# Patient Record
Sex: FEMALE | Race: BLACK OR AFRICAN AMERICAN | Employment: UNEMPLOYED | URBAN - METROPOLITAN AREA
[De-identification: names, ages, dates, MRNs, and addresses within clinical notes are randomized per-mention and may not be internally consistent; named-entity substitution may affect disease eponyms.]

---

## 2017-01-21 DIAGNOSIS — D50.9 IRON DEFICIENCY ANEMIA, UNSPECIFIED: Primary | ICD-10-CM

## 2017-01-23 NOTE — TELEPHONE ENCOUNTER
ferrous gluconate (FERGON) 324 (38 FE) MG      Last Written Prescription Date: 1/13/16  Last Fill Quantity:  90 refills: 3  Last Office Visit : 9/14/16  Future Office visit:  None  HEMOGLOBIN   Date Value Ref Range Status   01/20/2016 10.2* 11.7 - 15.7 g/dL Final    Routing refill request to provider for review/approval because:  Drug not active on patient's medication list Stopped by Patient 4/20/16 + hgb OVER DUE

## 2017-01-24 RX ORDER — FERROUS GLUCONATE 324(38)MG
TABLET ORAL
Qty: 90 TABLET | Refills: 3 | Status: SHIPPED | OUTPATIENT
Start: 2017-01-24

## 2017-05-04 ENCOUNTER — TELEPHONE (OUTPATIENT)
Dept: PHARMACY | Facility: OTHER | Age: 57
End: 2017-05-04

## 2017-05-04 NOTE — TELEPHONE ENCOUNTER
PA required on: Pantoprazole Sod 40mg  Qty: 30  Day Supply: 30  Patient Insurance is: [employer]   Insurance phone number is: [phone]  ID #: [id number]      Please let us know when PA is granted or denied.     Thank you,  Krista Owens Radha  [facility]

## 2017-05-05 NOTE — TELEPHONE ENCOUNTER
[facility] Prior Authorization Team   Phone: [phone]  Fax: [phone]    PA Initiation    Medication: PANTOPRAZOLE 40MG  Insurance Company: [employer] - Phone [phone] Fax [phone]  Pharmacy Filling the Rx: [facility] [place], [place] - [address] [place] [address] [phone]  Filling Pharmacy Phone: [phone]  Filling Pharmacy Fax:    Start Date: 5/5/2017

## 2017-05-09 DIAGNOSIS — K31.89 EROSIVE GASTROPATHY: ICD-10-CM

## 2017-05-09 NOTE — TELEPHONE ENCOUNTER
Prior Authorization Approval    Authorization Effective Date: 5/5/2017  Authorization Expiration Date: 5/5/2018  Medication: PANTOPRAZOLE 40MG - approved  Approved Dose/Quantity:   Reference #: [id number]   Insurance Company: [employer] - Phone [phone] Fax [phone]  Expected CoPay: n/a     CoPay Card Available:      Foundation Assistance Needed:    Which Pharmacy is filling the prescription (Not needed for infusion/clinic administered): [facility] [address] [phone]  Pharmacy Notified: Yes  Patient Notified: Yes      Per pharmacy they need an updated script for this medication

## 2017-05-11 RX ORDER — PANTOPRAZOLE SODIUM 40 MG/1
40 TABLET, DELAYED RELEASE ORAL DAILY
Qty: 90 TABLET | Refills: 1 | Status: SHIPPED | OUTPATIENT
Start: 2017-05-11 | End: 2018-05-03

## 2017-05-11 NOTE — TELEPHONE ENCOUNTER
Protonix      Last Written Prescription Date:  4-20-16  Last Fill Quantity: 30,   # refills: 11  Last Office Visit : 9-14-16  Future Office visit:  none    Kathleen M Doege RN

## 2017-05-18 DIAGNOSIS — I10 ESSENTIAL HYPERTENSION: ICD-10-CM

## 2017-05-18 DIAGNOSIS — I16.1 HYPERTENSIVE EMERGENCY: ICD-10-CM

## 2017-05-18 DIAGNOSIS — I26.99 OTHER PULMONARY EMBOLISM WITHOUT ACUTE COR PULMONALE, UNSPECIFIED CHRONICITY (H): ICD-10-CM

## 2017-05-18 DIAGNOSIS — I24.9 ACS (ACUTE CORONARY SYNDROME) (H): ICD-10-CM

## 2017-05-22 RX ORDER — ATORVASTATIN CALCIUM 20 MG/1
20 TABLET, FILM COATED ORAL DAILY
Qty: 90 TABLET | Refills: 0 | Status: SHIPPED | OUTPATIENT
Start: 2017-05-22 | End: 2018-05-03

## 2017-05-22 RX ORDER — LOSARTAN POTASSIUM 25 MG/1
25 TABLET ORAL DAILY
Qty: 90 TABLET | Refills: 0 | Status: SHIPPED | OUTPATIENT
Start: 2017-05-22 | End: 2017-09-20

## 2017-05-22 NOTE — TELEPHONE ENCOUNTER
losartan (COZAAR) 25 MG      Last Written Prescription Date:4/20/16  Last Fill Quantity: 30, # refills: 11  Last Office Visit : 9/14/16  Next Clinic Visit: NONE    Potassium   Date Value Ref Range Status   09/14/2016 3.8 3.4 - 5.3 mmol/L Final     Creatinine   Date Value Ref Range Status   09/14/2016 1.08 (H) 0.52 - 1.04 mg/dL Final     BP Readings from Last 3 Encounters:   10/06/16 123/78   09/14/16 150/83   05/19/16 145/86      atorvastatin        Last Written Prescription Date:  4/20/16  Last Fill Quantity: 30,   # refills: 11     rivaroxaban      Last Written Prescription Date:  4/20/16  Last Fill Quantity: 30,   # refills: 11  CBC RESULTS:   Recent Labs   Lab Test  01/20/16   1225   WBC  3.5*   RBC  4.22   HGB  10.2*   HCT  32.2*   MCV  76*   MCH  24.2*   MCHC  31.7   RDW  16.3*   PLT  213     Creatinine   Date Value Ref Range Status   09/14/2016 1.08 (H) 0.52 - 1.04 mg/dL Final   Routing refill request to provider for review/approval because:  CBC  OVERDUE

## 2017-05-23 NOTE — TELEPHONE ENCOUNTER
Can you ask her to come in for CBC and bmp.  Thanks.        Topic Date Due     Colon Cancer Screening - every 10 years.  11/01/2022

## 2017-06-05 ENCOUNTER — TELEPHONE (OUTPATIENT)
Dept: INTERNAL MEDICINE | Facility: CLINIC | Age: 57
End: 2017-06-05

## 2017-06-05 ENCOUNTER — TELEPHONE (OUTPATIENT)
Dept: RHEUMATOLOGY | Facility: CLINIC | Age: 57
End: 2017-06-05

## 2017-06-05 NOTE — TELEPHONE ENCOUNTER
Writer called patient and a language barrier noted. Patient speaks english but, difficult to understand. Writer informed patient that she is overdue for labs and monitoring of toxicity id required while taking medication. Patient repeatedly asked about appointment letter being sent to home and she has not had to see the provider in over an year. No consent to communicate on file and no language listed for interpretation.   Lucille Carlson LPN

## 2017-06-05 NOTE — TELEPHONE ENCOUNTER
Call from patient's daughter, Marie. Patient having edema of feet and ankles that has been worse than usual for the past 2-3 weeks. Called to schedule appointment with Dr. Duran and first available was 7/19/17. Patient has a lab appointment on 6/9/17. Scheduled her to see Virginia Shane NP in [facility] that same day. Will then keep appointment with Dr. Duran in July for follow-up.

## 2017-06-05 NOTE — TELEPHONE ENCOUNTER
----- Message from Lucille Carlson LPN sent at 6/5/2017  2:36 PM CDT -----  Regarding: FW: Dr. Webber - Pt question on labs  Contact: [phone]      ----- Message -----     From: Yulia Cohen     Sent: 6/5/2017   1:23 PM       To: Adult Rheum Triage-  Subject: Dr. Webber - Pt question on labs                 Hello  Pt coming in 9/14 2:30p with Dr. Webber - Pt scheduled labs for 12p.  Pt wondering if that's enough time for the labs to get to the appt.  Pt can be reached at [phone].  Ok to leave .  Thank you  Yulia  [facility]

## 2017-06-26 ENCOUNTER — MYC MEDICAL ADVICE (OUTPATIENT)
Dept: INTERNAL MEDICINE | Facility: CLINIC | Age: 57
End: 2017-06-26

## 2017-06-26 RX ORDER — PREDNISONE 2.5 MG/1
TABLET ORAL
Qty: 60 TABLET | Refills: 6 | OUTPATIENT
Start: 2017-06-26

## 2017-06-27 DIAGNOSIS — M32.19 SYSTEMIC LUPUS ERYTHEMATOSUS WITH OTHER ORGAN INVOLVEMENT: ICD-10-CM

## 2017-06-27 DIAGNOSIS — Z79.60 LONG-TERM USE OF IMMUNOSUPPRESSANT MEDICATION: Primary | ICD-10-CM

## 2017-06-27 RX ORDER — AZATHIOPRINE 50 MG/1
50 TABLET ORAL DAILY
Qty: 30 TABLET | Refills: 0 | Status: SHIPPED | OUTPATIENT
Start: 2017-06-27 | End: 2017-08-18

## 2017-06-27 NOTE — LETTER
[facility] RHEUMATOLOGY  [address]  [address] [facility] [address]  [phone]      June 27, 2017      Nikki Hobbs  [address] [facility] [address]    Dear Nikki,    This letter is a reminder that you are overdue for needed labs. You must have appropriate labs drawn every 8-12 weeks for prescription refills.   Your most recent labs on record are below:    CBC RESULTS:   Recent Labs   Lab Test  01/20/16   1225   WBC  3.5*   RBC  4.22   HGB  10.2*   HCT  32.2*   MCV  76*   MCH  24.2*   MCHC  31.7   RDW  16.3*   PLT  213     Lab Results   Component Value Date    ALT 12 01/20/2016     Lab Results   Component Value Date    ALBUMIN 3.3 11/13/2015     A 30 day supply/refill of your azaTHIOprine (IMURAN) 50 MG tablet has been sent to your doctor for approval while you get your labs completed.      Regards,  Rheumatology Clinic Staff

## 2017-06-27 NOTE — TELEPHONE ENCOUNTER
azaTHIOprine (IMURAN) 50 MG tablet      Last Written Prescription Date:  10/6/2016  Last Fill Quantity: 60,   # refills: 3  Last Office Visit : 10/6/2016  Future Office visit:  9/14/2017    CBC RESULTS:   Recent Labs   Lab Test  01/20/16   1225   WBC  3.5*   RBC  4.22   HGB  10.2*   HCT  32.2*   MCV  76*   MCH  24.2*   MCHC  31.7   RDW  16.3*   PLT  213     Lab Results   Component Value Date    ALT 12 01/20/2016     Lab Results   Component Value Date    ALBUMIN 3.3 11/13/2015       Routing refill request to provider:  > 6 months since last visit and all required labs > 12 weeks since last done - CBC, ALT, Albumin.  Per protocol, will pend Rx for 1 month supply and no refills. Lab letter sent.

## 2017-06-28 NOTE — TELEPHONE ENCOUNTER
Regarding: Medication Question  Contact: [phone]  Suze (daughter) called.   She would like to speak with you in regards to the patient's medication rivaroxaban ANTICOAGULANT (XARELTO) 15 MG TABS tablet.  Wants to discuss about changing the dosage.     June 28, 2017 8:30 AM  Spoke with patient's daughter, Suze. Patient's insurance has lapsed. She will be going to complete the paperwork tomorrow, but it may take a week or two for the insurance to be reinstated. Patient is out of Xarelto, and they would like to substitute aspirin for the anticoagulant until they can get Xarelto through insurance again. Explained that aspirin will not provide the same protection against blood clots. Spoke with [facility], who recommended that I contact [employer] about a co-pay card for Xarelto. I did contact [employer] at #[phone]. The co-pay card would not be appropriate in patient's situation, but they do have a free 30-day trial program that patient would be eligible for and could get as soon as today. Provided Suze with phone number to call for that progam. Patient will need to give verbal permission for [employer] to speak with Suze. Advised Suze to contact the clinic if she is not able to get Xarelto for patient through this program.   Tere Rodríguez RN, Care Coordinator

## 2017-07-10 DIAGNOSIS — M79.2 NEUROPATHIC PAIN: ICD-10-CM

## 2017-07-10 DIAGNOSIS — M32.19 SYSTEMIC LUPUS ERYTHEMATOSUS WITH OTHER ORGAN INVOLVEMENT: Primary | ICD-10-CM

## 2017-07-11 PROBLEM — M32.19 SYSTEMIC LUPUS ERYTHEMATOSUS WITH OTHER ORGAN INVOLVEMENT: Status: ACTIVE | Noted: 2017-07-11

## 2017-07-11 RX ORDER — GABAPENTIN 100 MG/1
100 CAPSULE ORAL 3 TIMES DAILY
Qty: 90 CAPSULE | Refills: 1 | Status: SHIPPED | OUTPATIENT
Start: 2017-07-11 | End: 2017-11-29

## 2017-07-11 RX ORDER — PREDNISONE 2.5 MG/1
2.5 TABLET ORAL DAILY
Qty: 60 TABLET | Refills: 0 | Status: SHIPPED | OUTPATIENT
Start: 2017-07-11 | End: 2017-09-22

## 2017-07-11 NOTE — TELEPHONE ENCOUNTER
gabapentin  100 MG       Last Written Prescription Date:  4/20/16  Last Fill Quantity: 90,   # refills: 11  Last Office Visit : 9/14/16  Future Office visit:  7/19/17  Creatinine   Date Value Ref Range Status   09/14/2016 1.08 (H) 0.52 - 1.04 mg/dL Final     BP Readings from Last 3 Encounters:   10/06/16 123/78   09/14/16 150/83   05/19/16 145/86

## 2017-07-11 NOTE — TELEPHONE ENCOUNTER
predniSONE  2.5 MG      Last Written Prescription Date:  5/19/16  Last Fill Quantity: 60,   # refills: 6  Last Office Visit : 10/6/16  Future Office visit:  9/14/17  Routing refill request to provider for review/approval because:  Drug not active on patient's medication list  DC'D  10/6/16   Therapy completed

## 2017-08-18 DIAGNOSIS — Z79.60 LONG-TERM USE OF IMMUNOSUPPRESSANT MEDICATION: ICD-10-CM

## 2017-08-18 DIAGNOSIS — M32.19 SYSTEMIC LUPUS ERYTHEMATOSUS WITH OTHER ORGAN INVOLVEMENT: ICD-10-CM

## 2017-08-18 RX ORDER — AZATHIOPRINE 50 MG/1
50 TABLET ORAL DAILY
Qty: 30 TABLET | Refills: 0 | Status: SHIPPED | OUTPATIENT
Start: 2017-08-18 | End: 2017-09-22

## 2017-08-18 NOTE — TELEPHONE ENCOUNTER
azathioprine  Last Written Prescription Date:  6/27/17  Last Fill Quantity: 30,   # refills: 0  Last Office Visit: 10/6/16  Future Office visit:  9/14/17    CBC RESULTS:   Recent Labs   Lab Test  01/20/16   1225   WBC  3.5*   RBC  4.22   HGB  10.2*   HCT  32.2*   MCV  76*   MCH  24.2*   MCHC  31.7   RDW  16.3*   PLT  213       Creatinine   Date Value Ref Range Status   09/14/2016 1.08 (H) 0.52 - 1.04 mg/dL Final   ]    Liver Function Studies -   Recent Labs   Lab Test  01/20/16   1225  11/13/15   1131   PROTTOTAL   --   7.0   ALBUMIN   --   3.3*   BILITOTAL   --   0.5   ALKPHOS   --   51   AST  21  21   ALT  12  19       Routing refill request to provider for review/approval because:  Drug not on the G, P or [facility] refill protocol or controlled substance

## 2017-09-20 DIAGNOSIS — I10 ESSENTIAL HYPERTENSION: ICD-10-CM

## 2017-09-20 NOTE — LETTER
September 21, 2017      TO: Nikki Anjel  [address] [facility] [address]     Dear Ms. Nikki Anjel,    This letter is a reminder that you are overdue to see your Primary Care Provider for an Annual Visit and needed labs. You must be seen by your Primary Care Provider on a yearly basis and have appropriate labs drawn for continued care and prescription refills. Please call [phone] to schedule an appointment for an Annual Visit with LIZ COSTA MD.  LAST MD APPT.  9/14/16     You have been given a 30 day supply/refill of your losartan (COZAAR) 25 MG  while you get your clinic visit/labs completed.    Regards,  Primary Care Center

## 2017-09-21 RX ORDER — LOSARTAN POTASSIUM 25 MG/1
25 TABLET ORAL DAILY
Qty: 30 TABLET | Refills: 0 | Status: SHIPPED | OUTPATIENT
Start: 2017-09-21 | End: 2017-10-26

## 2017-09-21 NOTE — TELEPHONE ENCOUNTER
losartan (COZAAR)      Last Written Prescription Date:5/22/17  Last Fill Quantity: 90, # refills: 0  Last Office Visit : 9/14/16  Next Clinic Visit: NONE    Potassium   Date Value Ref Range Status   09/14/2016 3.8 3.4 - 5.3 mmol/L Final     Creatinine   Date Value Ref Range Status   09/14/2016 1.08 (H) 0.52 - 1.04 mg/dL Final     BP Readings from Last 3 Encounters:   10/06/16 123/78   09/14/16 150/83   05/19/16 145/86     OVER MIGUELINA PALMER APPT.  LETTER SENT + 30 DAY RF

## 2017-09-22 DIAGNOSIS — Z79.60 LONG-TERM USE OF IMMUNOSUPPRESSANT MEDICATION: ICD-10-CM

## 2017-09-22 DIAGNOSIS — M32.19 SYSTEMIC LUPUS ERYTHEMATOSUS WITH OTHER ORGAN INVOLVEMENT: ICD-10-CM

## 2017-09-22 RX ORDER — AZATHIOPRINE 50 MG/1
50 TABLET ORAL DAILY
Qty: 30 TABLET | Refills: 0 | Status: SHIPPED | OUTPATIENT
Start: 2017-09-22 | End: 2017-10-28

## 2017-09-22 RX ORDER — PREDNISONE 2.5 MG/1
2.5 TABLET ORAL DAILY
Qty: 30 TABLET | Refills: 0 | Status: SHIPPED | OUTPATIENT
Start: 2017-09-22 | End: 2017-10-28

## 2017-09-22 NOTE — LETTER
9/22/2017     Nikki Hobbs  [address] [facility] [address]      Dear Nikki:    This letter is a reminder that you are overdue to see your Rheumatologist for clinic visit and needed labs. Please call [phone] to schedule an appointment with KAYA Morales [facility] RHEUMATOLOGY  [address] [address]  Phone: [phone]  Fax: [phone]

## 2017-09-22 NOTE — TELEPHONE ENCOUNTER
Imuran  Last Written Prescription Date:  8/18/17  Last Fill Quantity: 30,   # refills: 0  Prednisone  Last Written: 7/11/17  #60 refill 0  Last Office Visit: 10/6/16  Future Office visit:  No future appt    CBC RESULTS:   Recent Labs   Lab Test  01/20/16   1225   WBC  3.5*   RBC  4.22   HGB  10.2*   HCT  32.2*   MCV  76*   MCH  24.2*   MCHC  31.7   RDW  16.3*   PLT  213       Creatinine   Date Value Ref Range Status   09/14/2016 1.08 (H) 0.52 - 1.04 mg/dL Final   ]    Liver Function Studies -   Recent Labs   Lab Test  01/20/16   1225  11/13/15   1131   PROTTOTAL   --   7.0   ALBUMIN   --   3.3*   BILITOTAL   --   0.5   ALKPHOS   --   51   AST  21  21   ALT  12  19       Routing refill request to provider for review/approval because:  Drug not on the [facility], P or [facility] refill protocol or controlled substance  Letter sent asking pt to schedule and notified that labs are due

## 2017-10-26 DIAGNOSIS — I10 ESSENTIAL HYPERTENSION: ICD-10-CM

## 2017-10-26 DIAGNOSIS — M79.2 NEUROPATHIC PAIN: ICD-10-CM

## 2017-10-27 RX ORDER — GABAPENTIN 100 MG/1
CAPSULE ORAL
Qty: 90 CAPSULE | Refills: 1 | OUTPATIENT
Start: 2017-10-27

## 2017-10-28 DIAGNOSIS — Z79.60 LONG-TERM USE OF IMMUNOSUPPRESSANT MEDICATION: ICD-10-CM

## 2017-10-31 DIAGNOSIS — Z53.9 ERRONEOUS ENCOUNTER--DISREGARD: Primary | ICD-10-CM

## 2017-10-31 RX ORDER — LOSARTAN POTASSIUM 25 MG/1
25 TABLET ORAL DAILY
Qty: 30 TABLET | Refills: 1 | Status: SHIPPED | OUTPATIENT
Start: 2017-10-31 | End: 2018-01-10

## 2017-10-31 NOTE — TELEPHONE ENCOUNTER
I spoke with patient and advised her of need to schedule appointment to see Dr. Duran. Will give additional 30-day refill of losartan with 1 refill so that patient is not without her medication and has time to get in for appointment. Confirmed with pharmacy that patient picked up 90-day supply of gabapentin on 9/23/17, so no refill should be needed on that at this time.     Potassium   Date Value Ref Range Status   09/14/2016 3.8 3.4 - 5.3 mmol/L Final     Lab Results   Component Value Date    CR 1.08 09/14/2016     BP Readings from Last 3 Encounters:   10/06/16 123/78   09/14/16 150/83   05/19/16 145/86

## 2017-10-31 NOTE — TELEPHONE ENCOUNTER
azaTHIOprine       Last Written Prescription Date:  9/22/17  Last Fill Quantity: 30,   # refills: 0  Last Office Visit: 10/6/16  Future Office visit:  NONE    CBC RESULTS:   Recent Labs   Lab Test  01/20/16   1225   WBC  3.5*   RBC  4.22   HGB  10.2*   HCT  32.2*   MCV  76*   MCH  24.2*   MCHC  31.7   RDW  16.3*   PLT  213       Creatinine   Date Value Ref Range Status   09/14/2016 1.08 (H) 0.52 - 1.04 mg/dL Final   ]    Liver Function Studies -   Recent Labs   Lab Test  01/20/16   1225  11/13/15   1131   PROTTOTAL   --   7.0   ALBUMIN   --   3.3*   BILITOTAL   --   0.5   ALKPHOS   --   51   AST  21  21   ALT  12  19     Routing refill request to provider for review/approval because: OVER DUE MD APPT/ LABS      predniSONE       Last Written Prescription Date:  9/22/17  Last Fill Quantity: 30,   # refills: 0  OVER DUE MD APPT/ LABS

## 2017-11-01 RX ORDER — AZATHIOPRINE 50 MG/1
50 TABLET ORAL DAILY
Qty: 30 TABLET | Refills: 0 | Status: SHIPPED | OUTPATIENT
Start: 2017-11-01 | End: 2017-12-01

## 2017-11-01 RX ORDER — PREDNISONE 2.5 MG/1
2.5 TABLET ORAL DAILY
Qty: 30 TABLET | Refills: 0 | Status: SHIPPED | OUTPATIENT
Start: 2017-11-01 | End: 2017-12-05

## 2017-11-29 DIAGNOSIS — M79.2 NEUROPATHIC PAIN: ICD-10-CM

## 2017-11-29 DIAGNOSIS — D63.8 ANEMIA IN OTHER CHRONIC DISEASES CLASSIFIED ELSEWHERE: ICD-10-CM

## 2017-11-29 NOTE — LETTER
Nikki Hobbs  [address] [facility] [address]          This letter is a reminder that you are overdue to see your Primary Care Provider for an Annual Visit and needed labs. You must be seen by your Primary Care Provider on a yearly basis and have appropriate labs drawn for continued care and prescription refills. Please call [phone] to schedule an appointment for an Annual Visit with Dr Roger PALMER.         [facility],    Primary Care [place]

## 2017-12-01 DIAGNOSIS — Z79.60 LONG-TERM USE OF IMMUNOSUPPRESSANT MEDICATION: ICD-10-CM

## 2017-12-04 NOTE — TELEPHONE ENCOUNTER
gabapentin (NEURONTIN) 100 MG capsule    Last Written Prescription Date:  7/11/17  Last Fill Quantity: 90,   # refills: 1  Last Office Visit :   9/14/16  Future Office visit:   none    ascorbic acid (VITAMIN C) 500 MG tablet  Last Written Prescription Date:  6/3/16  Last Fill Quantity: 90,   # refills: 3      appt letter sent.    Routing  Because:   gabapentin (NEURONTIN) 100 MG capsule. Not on new protocol. > 1 yr since last visit.

## 2017-12-05 DIAGNOSIS — Z79.60 LONG-TERM USE OF IMMUNOSUPPRESSANT MEDICATION: Primary | ICD-10-CM

## 2017-12-05 DIAGNOSIS — M32.19 SYSTEMIC LUPUS ERYTHEMATOSUS WITH OTHER ORGAN INVOLVEMENT, UNSPECIFIED SLE TYPE (H): ICD-10-CM

## 2017-12-05 NOTE — TELEPHONE ENCOUNTER
azaTHIOprine (IMURAN) 50 MG tablet      Last Written Prescription Date:  11-1-17  Last Fill Quantity: 30,   # refills: 0  Last Office Visit: 10-6-16  Future Office visit:  none    CBC RESULTS:   Recent Labs   Lab Test  01/20/16   1225   WBC  3.5*   RBC  4.22   HGB  10.2*   HCT  32.2*   MCV  76*   MCH  24.2*   MCHC  31.7   RDW  16.3*   PLT  213       Creatinine   Date Value Ref Range Status   09/14/2016 1.08 (H) 0.52 - 1.04 mg/dL Final   ]    Liver Function Studies -   Recent Labs   Lab Test  01/20/16   1225  11/13/15   1131   PROTTOTAL   --   7.0   ALBUMIN   --   3.3*   BILITOTAL   --   0.5   ALKPHOS   --   51   AST  21  21   ALT  12  19     Pt has had 2 letters sent this year about labs and clinic appointments  Pt has cancelled 1 appointment and NOS another appointment since last seen.    Sending to clinic staff prior to sending to provider due to compliance issues    Kathleen M Doege RN

## 2017-12-07 ENCOUNTER — OFFICE VISIT (OUTPATIENT)
Dept: FAMILY MEDICINE | Facility: CLINIC | Age: 57
End: 2017-12-07
Payer: COMMERCIAL

## 2017-12-07 VITALS
SYSTOLIC BLOOD PRESSURE: 114 MMHG | WEIGHT: 107 LBS | BODY MASS INDEX: 18.96 KG/M2 | DIASTOLIC BLOOD PRESSURE: 72 MMHG | HEIGHT: 63 IN | HEART RATE: 96 BPM | TEMPERATURE: 98.4 F | OXYGEN SATURATION: 100 %

## 2017-12-07 DIAGNOSIS — M32.19 SYSTEMIC LUPUS ERYTHEMATOSUS WITH OTHER ORGAN INVOLVEMENT, UNSPECIFIED SLE TYPE (H): Primary | ICD-10-CM

## 2017-12-07 DIAGNOSIS — Z23 NEED FOR PROPHYLACTIC VACCINATION AND INOCULATION AGAINST INFLUENZA: ICD-10-CM

## 2017-12-07 LAB
ANION GAP SERPL CALCULATED.3IONS-SCNC: 10 MMOL/L (ref 3–14)
BUN SERPL-MCNC: 9 MG/DL (ref 7–30)
CALCIUM SERPL-MCNC: 8.8 MG/DL (ref 8.5–10.1)
CHLORIDE SERPL-SCNC: 107 MMOL/L (ref 94–109)
CO2 SERPL-SCNC: 25 MMOL/L (ref 20–32)
CREAT SERPL-MCNC: 1.03 MG/DL (ref 0.52–1.04)
GFR SERPL CREATININE-BSD FRML MDRD: 55 ML/MIN/1.7M2
GLUCOSE SERPL-MCNC: 74 MG/DL (ref 70–99)
POTASSIUM SERPL-SCNC: 3.7 MMOL/L (ref 3.4–5.3)
SODIUM SERPL-SCNC: 142 MMOL/L (ref 133–144)

## 2017-12-07 PROCEDURE — 80048 BASIC METABOLIC PNL TOTAL CA: CPT | Performed by: FAMILY MEDICINE

## 2017-12-07 PROCEDURE — 99213 OFFICE O/P EST LOW 20 MIN: CPT | Mod: 25 | Performed by: PHYSICIAN ASSISTANT

## 2017-12-07 PROCEDURE — 90471 IMMUNIZATION ADMIN: CPT | Performed by: PHYSICIAN ASSISTANT

## 2017-12-07 PROCEDURE — 36415 COLL VENOUS BLD VENIPUNCTURE: CPT | Performed by: FAMILY MEDICINE

## 2017-12-07 PROCEDURE — 90686 IIV4 VACC NO PRSV 0.5 ML IM: CPT | Performed by: PHYSICIAN ASSISTANT

## 2017-12-07 RX ORDER — PREDNISONE 2.5 MG/1
2.5 TABLET ORAL DAILY
Qty: 30 TABLET | Refills: 0 | Status: SHIPPED | OUTPATIENT
Start: 2017-12-07 | End: 2018-01-03

## 2017-12-07 RX ORDER — AZATHIOPRINE 50 MG/1
50 TABLET ORAL DAILY
Qty: 30 TABLET | Refills: 0 | Status: SHIPPED | OUTPATIENT
Start: 2017-12-07 | End: 2018-01-03

## 2017-12-07 NOTE — PROGRESS NOTES
"  SUBJECTIVE:   Nikki Hobbs is a 57 year old female who presents to clinic today for the following health issues:      Pt is here with concerns on the following  Weight loss, dry skin, wrinkles and dark marks on stomach  Onset: Dark marks started last year along with the rest of the symptoms.       Problem list and histories reviewed & adjusted, as indicated.  Additional history: PMHx sig for SLE. She is unsure of the status of her renal function.  She is amenable to the lab work-up that was recommended by her rheumatologist at this time.  She will also schedule her follow up appt today.       ROS:  Constitutional, HEENT, cardiovascular, pulmonary, gi and gu systems are negative, except as otherwise noted.      OBJECTIVE:   /72 (BP Location: Right arm, Patient Position: Sitting, Cuff Size: Adult Small)  Pulse 96  Temp 98.4  F (36.9  C) (Oral)  Ht 5' 3\" (1.6 m)  Wt 107 lb (48.5 kg)  SpO2 100%  BMI 18.95 kg/m2  Body mass index is 18.95 kg/(m^2).  GENERAL: healthy, alert and no distress  SKIN: dry skin with ichthyotic scales on extremities c/w dehydration.  Hypermelanosis several sites.     ASSESSMENT/PLAN:   1. Systemic lupus erythematosus with other organ involvement, unspecified SLE type (H)    2. Need for prophylactic vaccination and inoculation against influenza   FLU VAC, SPLIT VIRUS IM > 3 YO (QUADRIVALENT) [41800]  - Vaccine Administration, Initial [31698]    Follow up on labs  Follow up per Rheumatology  Patient amenable to this follow up plan.     Patricia Altman PA-C  [facility]  Injectable Influenza Immunization Documentation    1.  Is the person to be vaccinated sick today?   No    2. Does the person to be vaccinated have an allergy to a component   of the vaccine?   No  Egg Allergy Algorithm Link    3. Has the person to be vaccinated ever had a serious reaction   to influenza vaccine in the past?   No    4. Has the person to be vaccinated ever had Guillain-Barré syndrome?   " No    Form completed by Tere Juarez MA

## 2017-12-07 NOTE — TELEPHONE ENCOUNTER
Last Written Prescription Date:  11/1/17  Last Fill Quantity: 30,   # refills: 0  Last Office Visit : 10/6/16  Future Office visit:  NONE  Routing refill request to provider for review/approval because: MICHEL  11/1/17  #30RF   NO APPT  F/U

## 2017-12-07 NOTE — MR AVS SNAPSHOT
"              After Visit Summary   12/7/2017    Nikki Hobbs    MRN: [id number]           Patient Information     Date Of Birth          1960        Visit Information        Provider Department      12/7/2017 10:20 AM Patricia Altman PA-C [facility]        Today's Diagnoses     Systemic lupus erythematosus with other organ involvement, unspecified SLE type (H)    -  1    Need for prophylactic vaccination and inoculation against influenza           Follow-ups after your visit        Who to contact     If you have questions or need follow up information about today's clinic visit or your schedule please contact [facility] directly at [phone].  Normal or non-critical lab and imaging results will be communicated to you by iTOKhart, letter or phone within 4 business days after the clinic has received the results. If you do not hear from us within 7 days, please contact the clinic through iTOKhart or phone. If you have a critical or abnormal lab result, we will notify you by phone as soon as possible.  Submit refill requests through "[employer] or call your pharmacy and they will forward the refill request to us. Please allow 3 business days for your refill to be completed.          Additional Information About Your Visit        MyChart Information     "[employer] gives you secure access to your electronic health record. If you see a primary care provider, you can also send messages to your care team and make appointments. If you have questions, please call your primary care clinic.  If you do not have a primary care provider, please call [phone] and they will assist you.        Care EveryWhere ID     This is your Care EveryWhere ID. This could be used by other organizations to access your [facility] medical records  [id number]        Your Vitals Were     Pulse Temperature Height Pulse Oximetry BMI (Body Mass Index)       96 98.4  F (36.9  C) (Oral) 5' 3\" (1.6 m) 100% 18.95 kg/m2     "    Blood Pressure from Last 3 Encounters:   12/07/17 114/72   10/06/16 123/78   09/14/16 150/83    Weight from Last 3 Encounters:   12/07/17 107 lb (48.5 kg)   10/06/16 109 lb (49.4 kg)   09/14/16 111 lb 9.6 oz (50.6 kg)              We Performed the Following     Basic metabolic panel     FLU VAC, SPLIT VIRUS IM > 3 YO (QUADRIVALENT) [36668]     Vaccine Administration, Initial [32965]        Primary Care Provider Office Phone # Fax #    Kimberli Urszula Duran [phone]       [address] [facility] [address] [place] [address]        Equal Access to Services     GUILLERMO ROBERSON : Hadii jan Alexandre, waaxda luqadaha, qaybta kaalmada adejoseyaelmira, paulina cagle. So [facility] [phone].    ATENCIÓN: Si habla español, tiene a chang disposición servicios gratuitos de asistencia lingüística. Llame al [phone].    We comply with applicable federal civil rights laws and [place] laws. We do not discriminate on the basis of race, color, national origin, age, disability, sex, sexual orientation, or gender identity.            Thank you!     Thank you for choosing [facility]  for your care. Our goal is always to provide you with excellent care. Hearing back from our patients is one way we can continue to improve our services. Please take a few minutes to complete the written survey that you may receive in the mail after your visit with us. Thank you!             Your Updated Medication List - Protect others around you: Learn how to safely use, store and throw away your medicines at www.disposemymeds.org.          This list is accurate as of: 12/7/17 11:12 AM.  Always use your most recent med list.                   Brand Name Dispense Instructions for use Diagnosis    amLODIPine 2.5 MG tablet    NORVASC    30 tablet    Take 1 tablet (2.5 mg) by mouth daily    Hypertensive emergency       ascorbic acid 500 MG tablet    VITAMIN C    90 tablet    Take 1 tablet (500  mg) by mouth daily    Anemia in other chronic diseases classified elsewhere       atorvastatin 20 MG tablet    LIPITOR    90 tablet    Take 1 tablet (20 mg) by mouth daily *SEPT. 2017 APPT. NEEDED FOR REFILLS*    Hypertensive emergency, ACS (acute coronary syndrome) (H)       azaTHIOprine 50 MG tablet    IMURAN    30 tablet    Take 1 tablet (50 mg) by mouth daily *LABS DUE EVERY 8-12 WKS    Long-term use of immunosuppressant medication       cholecalciferol 1000 UNIT tablet    vitamin D3    30 tablet    Take 1 tablet (1,000 Units) by mouth daily    Vitamin D deficiency       ferrous gluconate 324 (38 FE) MG tablet    FERGON    90 tablet    TAKE ONE TABLET BY MOUTH THREE TIMES PER WEEK WITH FOOD    Iron deficiency anemia, unspecified       gabapentin 100 MG capsule    NEURONTIN    90 capsule    Take 1 capsule (100 mg) by mouth 3 times daily *LABS DUE SEPT. 2017    Neuropathic pain       losartan 25 MG tablet    COZAAR    30 tablet    Take 1 tablet (25 mg) by mouth daily    Essential hypertension       pantoprazole 40 MG EC tablet    PROTONIX    90 tablet    Take 1 tablet (40 mg) by mouth daily    Erosive gastropathy       predniSONE 2.5 MG tablet    DELTASONE    30 tablet    Take 1 tablet (2.5 mg) by mouth daily    Long-term use of immunosuppressant medication       ranitidine 150 MG tablet    ZANTAC    60 tablet    Take 1 tablet (150 mg) by mouth 2 times daily    Heartburn       rivaroxaban ANTICOAGULANT 15 MG Tabs tablet    XARELTO    90 tablet    Take 1 tablet (15 mg) by mouth daily (with dinner) *MD APPT. DUE SEPT. 2017    Other pulmonary embolism without acute cor pulmonale, unspecified chronicity (H)

## 2017-12-07 NOTE — NURSING NOTE
"Chief Complaint   Patient presents with     Weight Loss       Initial /72 (BP Location: Right arm, Patient Position: Sitting, Cuff Size: Adult Small)  Pulse 96  Temp 98.4  F (36.9  C) (Oral)  Ht 5' 3\" (1.6 m)  Wt 107 lb (48.5 kg)  SpO2 100%  BMI 18.95 kg/m2 Estimated body mass index is 18.95 kg/(m^2) as calculated from the following:    Height as of this encounter: 5' 3\" (1.6 m).    Weight as of this encounter: 107 lb (48.5 kg).  Medication Reconciliation: complete   Tere Juarez MA    "

## 2017-12-07 NOTE — TELEPHONE ENCOUNTER
[facility] [facility] requesting Dr. Webber sign Rx below and prednisone Rx. Pt waiting since 12/11/30/17. Sent to [facility].

## 2017-12-08 RX ORDER — ASCORBIC ACID 500 MG
500 TABLET ORAL DAILY
Qty: 30 TABLET | Refills: 0 | Status: SHIPPED | OUTPATIENT
Start: 2017-12-08 | End: 2018-01-10

## 2017-12-08 RX ORDER — GABAPENTIN 100 MG/1
100 CAPSULE ORAL 3 TIMES DAILY
Qty: 90 CAPSULE | Refills: 0 | Status: SHIPPED | OUTPATIENT
Start: 2017-12-08 | End: 2018-01-10

## 2017-12-20 ENCOUNTER — TELEPHONE (OUTPATIENT)
Dept: INTERNAL MEDICINE | Facility: CLINIC | Age: 57
End: 2017-12-20

## 2017-12-20 NOTE — TELEPHONE ENCOUNTER
Regarding: Call request re: blood test  Contact: [phone]  Per call from PT's daughter Marie Re: last blood test on 12/7/17  She would like you to call her at [phone]    December 20, 2017 1:29 PM  Reviewed result of BMP done 12/7/17. Transferred call to scheduling to set up appointment for after the 1st of January. Marie will call if refills needed prior to appointment.   Tere Rodríguez RN, Care Coordinator

## 2017-12-20 NOTE — TELEPHONE ENCOUNTER
Called to discuss overdue labs and appt with Dr. Webber.  Pt did not want to set up an appt at this time. Discussed that she is overdue for labs and does need an appt to see Dr Webber.  Pt states that she understands and will call to make an appt.    Will follow up with pt in a couple of weeks if no appt made.    aMrgarita Leary RN  [facility]

## 2017-12-28 DIAGNOSIS — M32.19 SYSTEMIC LUPUS ERYTHEMATOSUS WITH OTHER ORGAN INVOLVEMENT, UNSPECIFIED SLE TYPE (H): ICD-10-CM

## 2017-12-28 DIAGNOSIS — Z79.60 LONG-TERM USE OF IMMUNOSUPPRESSANT MEDICATION: ICD-10-CM

## 2017-12-28 NOTE — TELEPHONE ENCOUNTER
Patient calling requesting refill of imuran and prednisone.  Currently on 2.5mg prednisone  [facility]

## 2018-01-02 NOTE — TELEPHONE ENCOUNTER
azaTHIOprine (IMURAN) 50 MG  Last Written Prescription Date:  12/7/17  Last Fill Quantity: 30,   # refills: 0  Last Office Visit : 10/6/16  Future Office visit:  2/15/18  CBC RESULTS:   Recent Labs   Lab Test  01/20/16   1225   WBC  3.5*   RBC  4.22   HGB  10.2*   HCT  32.2*   MCV  76*   MCH  24.2*   MCHC  31.7   RDW  16.3*   PLT  213       Creatinine   Date Value Ref Range Status   12/07/2017 1.03 0.52 - 1.04 mg/dL Final   ]    Liver Function Studies -   Recent Labs   Lab Test  01/20/16   1225  11/13/15   1131   PROTTOTAL   --   7.0   ALBUMIN   --   3.3*   BILITOTAL   --   0.5   ALKPHOS   --   51   AST  21  21   ALT  12  19     Routing refill request to provider for review/approval because:  OVER DUE LABS 1/20/16  & MD RTC APPT.      predniSONE (DELTASONE) 2.5 MG     Last Written Prescription Date:  12/7/17  Last Fill Quantity: 30,   # refills: 0  OVER DUE RTC

## 2018-01-03 RX ORDER — AZATHIOPRINE 50 MG/1
50 TABLET ORAL DAILY
Qty: 30 TABLET | Refills: 0 | OUTPATIENT
Start: 2018-01-03

## 2018-01-03 RX ORDER — PREDNISONE 2.5 MG/1
2.5 TABLET ORAL DAILY
Qty: 30 TABLET | Refills: 0 | OUTPATIENT
Start: 2018-01-03

## 2018-01-04 RX ORDER — PREDNISONE 2.5 MG/1
2.5 TABLET ORAL DAILY
Qty: 30 TABLET | Refills: 0 | Status: SHIPPED | OUTPATIENT
Start: 2018-01-04 | End: 2018-03-09

## 2018-01-04 RX ORDER — AZATHIOPRINE 50 MG/1
50 TABLET ORAL DAILY
Qty: 30 TABLET | Refills: 0 | Status: SHIPPED | OUTPATIENT
Start: 2018-01-04 | End: 2018-02-05

## 2018-01-10 DIAGNOSIS — M79.2 NEUROPATHIC PAIN: ICD-10-CM

## 2018-01-10 DIAGNOSIS — I10 ESSENTIAL HYPERTENSION: ICD-10-CM

## 2018-01-10 DIAGNOSIS — D63.8 ANEMIA IN OTHER CHRONIC DISEASES CLASSIFIED ELSEWHERE: ICD-10-CM

## 2018-01-11 RX ORDER — LOSARTAN POTASSIUM 25 MG/1
25 TABLET ORAL DAILY
Qty: 30 TABLET | Refills: 0 | Status: SHIPPED | OUTPATIENT
Start: 2018-01-11 | End: 2018-02-05

## 2018-01-11 NOTE — TELEPHONE ENCOUNTER
losartan (COZAAR)     Last Written Prescription Date:  10/31/17  Last Fill Quantity: 30,   # refills: 1  Last Office Visit : 9/14/16  Future Office visit:  1/26/18    gabapentin (NEURONTIN) 100 MG capsule      Last Written Prescription Date:  12/8/17  Last Fill Quantity: 90,   # refills: 0     ascorbic acid (VITAMIN C     Last Written Prescription Date:  12/8/17  Last Fill Quantity: 30,   # refills: 0    Routing  Because: gabapentin (NEURONTIN) 100 MG capsule ascorbic acid (VITAMIN C     not on protocol.

## 2018-01-12 RX ORDER — GABAPENTIN 100 MG/1
100 CAPSULE ORAL 3 TIMES DAILY
Qty: 90 CAPSULE | Refills: 0 | Status: SHIPPED | OUTPATIENT
Start: 2018-01-12 | End: 2018-05-03

## 2018-01-12 RX ORDER — ASCORBIC ACID 500 MG
500 TABLET ORAL DAILY
Qty: 30 TABLET | Refills: 0 | Status: SHIPPED | OUTPATIENT
Start: 2018-01-12

## 2018-02-05 DIAGNOSIS — M32.19 SYSTEMIC LUPUS ERYTHEMATOSUS WITH OTHER ORGAN INVOLVEMENT, UNSPECIFIED SLE TYPE (H): Primary | ICD-10-CM

## 2018-02-05 DIAGNOSIS — Z79.60 LONG-TERM USE OF IMMUNOSUPPRESSANT MEDICATION: ICD-10-CM

## 2018-02-05 DIAGNOSIS — D63.8 ANEMIA IN OTHER CHRONIC DISEASES CLASSIFIED ELSEWHERE: ICD-10-CM

## 2018-02-05 DIAGNOSIS — M32.19 SYSTEMIC LUPUS ERYTHEMATOSUS WITH OTHER ORGAN INVOLVEMENT, UNSPECIFIED SLE TYPE (H): ICD-10-CM

## 2018-02-05 DIAGNOSIS — I10 ESSENTIAL HYPERTENSION: ICD-10-CM

## 2018-02-05 DIAGNOSIS — D50.9 IRON DEFICIENCY ANEMIA: ICD-10-CM

## 2018-02-05 RX ORDER — PREDNISONE 2.5 MG/1
2.5 TABLET ORAL DAILY
Qty: 30 TABLET | Refills: 0 | Status: CANCELLED | OUTPATIENT
Start: 2018-02-05

## 2018-02-05 RX ORDER — PREDNISONE 2.5 MG/1
TABLET ORAL
Qty: 30 TABLET | Refills: 0 | Status: CANCELLED | OUTPATIENT
Start: 2018-02-05

## 2018-02-05 RX ORDER — AZATHIOPRINE 50 MG/1
TABLET ORAL
Qty: 30 TABLET | Refills: 0 | Status: CANCELLED | OUTPATIENT
Start: 2018-02-05

## 2018-02-05 RX ORDER — PREDNISONE 5 MG/1
5 TABLET ORAL DAILY
Qty: 30 TABLET | Refills: 0 | Status: SHIPPED | OUTPATIENT
Start: 2018-02-05 | End: 2018-05-03

## 2018-02-05 NOTE — TELEPHONE ENCOUNTER
Marie, pt's daughter, called to inquire on possible prednisone medication adjustment.     Marie states pt has been experiencing butterfly rash on face & several rash patches on her chest since November or December.     Marie would like to know if pt can be prescribed 5 mg of prednisone. Pt currently is staking 2.5 mg.    Pt's pharm is [address]    Jake can be called at pt's home [phone] or [phone].

## 2018-02-05 NOTE — TELEPHONE ENCOUNTER
ascorbic acid (VITAMIN C) 500 MG tablet      Last Written Prescription Date:  1/12/18  Last Fill Quantity: 30,   # refills: 0 *must keep appt 1/26/18     ferrous gluconate (FERGON) 324 (38 FE) MG tablet      Last Written Prescription Date:  1/24/17  Last Fill Quantity: 90,   # refills: 3    losartan (COZAAR) 25 MG tablet      Last Written Prescription Date:  1/11/18  Last Fill Quantity: 30,   # refills: 0  *please keep appt 1/26/18    Last Office Visit : 9/14/16  Future Office visit:  None    Routing refill request to provider for review/approval because:  Med's failed protocol- Appt overdue- no pending.  *pt cancelled 1/26/18 appt.  Message sent to scheduling.

## 2018-02-09 DIAGNOSIS — M32.19 SYSTEMIC LUPUS ERYTHEMATOSUS WITH OTHER ORGAN INVOLVEMENT, UNSPECIFIED SLE TYPE (H): ICD-10-CM

## 2018-02-09 RX ORDER — FERROUS GLUCONATE 324(38)MG
TABLET ORAL
Qty: 12 TABLET | Refills: 0 | OUTPATIENT
Start: 2018-02-09

## 2018-02-09 RX ORDER — LOSARTAN POTASSIUM 25 MG/1
TABLET ORAL
Qty: 30 TABLET | Refills: 0 | Status: SHIPPED | OUTPATIENT
Start: 2018-02-09 | End: 2018-05-03

## 2018-02-09 RX ORDER — ASCORBIC ACID 500 MG
500 TABLET ORAL DAILY
Qty: 30 TABLET | Refills: 0 | OUTPATIENT
Start: 2018-02-09

## 2018-02-09 NOTE — TELEPHONE ENCOUNTER
Will give ONE final month of losartan only.  She must be seen!    February 9, 2018 2:33 PM  Patient's daughter, Marie, notified. Patient was not able to come to appointment that was scheduled in January as she didn't have transportation.   Tere Rodríguez RN, Care Coordinator

## 2018-02-11 RX ORDER — PREDNISONE 5 MG/1
TABLET ORAL
Qty: 30 TABLET | Refills: 0 | OUTPATIENT
Start: 2018-02-11

## 2018-02-12 RX ORDER — AZATHIOPRINE 50 MG/1
50 TABLET ORAL DAILY
Qty: 30 TABLET | Refills: 1 | Status: SHIPPED | OUTPATIENT
Start: 2018-02-12

## 2018-03-09 DIAGNOSIS — Z79.60 LONG-TERM USE OF IMMUNOSUPPRESSANT MEDICATION: ICD-10-CM

## 2018-03-09 DIAGNOSIS — M32.19 SYSTEMIC LUPUS ERYTHEMATOSUS WITH OTHER ORGAN INVOLVEMENT, UNSPECIFIED SLE TYPE (H): ICD-10-CM

## 2018-03-12 RX ORDER — PREDNISONE 2.5 MG/1
2.5 TABLET ORAL DAILY
Qty: 30 TABLET | Refills: 0 | Status: SHIPPED | OUTPATIENT
Start: 2018-03-12 | End: 2018-04-10

## 2018-03-12 NOTE — TELEPHONE ENCOUNTER
"predniSONE (DELTASONE) 2.5 MG tablet  Last Written Prescription Date:  1/4/18  Last Fill Quantity: 30,   # refills: 0  Last Office Visit :10/6/16  Future Office visit: 3/23/18    Routing refill request to provider for review/approval because: please clarify dosing.last note 10/16 \"decreasing Prednisone to 2.5 mg every other day\"  "

## 2018-03-15 DIAGNOSIS — D50.9 IRON DEFICIENCY ANEMIA: ICD-10-CM

## 2018-03-18 NOTE — TELEPHONE ENCOUNTER
ferrous gluconate   Last Written Prescription Date:  1/24/17  Last Fill Quantity: 90,   # refills: 3  Last Office Visit : 9/14/16  Future Office visit:  NONE    Routing refill request to provider for review/approval because:  CLARIFY: LAST RF 90:3    SHOULD HAVE BEEN 36:3 IF TAKING AS DIRECTED?  Drug not on the  refill protocol or controlled substance  OVER DUE RTC APPT.  30 DAY RF PENDING  Scheduling has been notified to contact the pt for appointment.

## 2018-03-19 RX ORDER — FERROUS GLUCONATE 324(38)MG
TABLET ORAL
OUTPATIENT
Start: 2018-03-19

## 2018-03-26 DIAGNOSIS — I10 ESSENTIAL HYPERTENSION: ICD-10-CM

## 2018-03-26 RX ORDER — LOSARTAN POTASSIUM 25 MG/1
25 TABLET ORAL DAILY
Qty: 30 TABLET | Refills: 0 | OUTPATIENT
Start: 2018-03-26

## 2018-03-26 NOTE — TELEPHONE ENCOUNTER
losartan (COZAAR) 25 MG     Last Written Prescription Date:  2/9/18  Last Fill Quantity: 30,   # refills: 0  Last Office Visit : 9/14/16  Future Office visit:  NONE    Routing refill request to provider for review/approval because:  OVER MD APPT.  30 DAY RF 2/9/18    NO APPT/ F/U

## 2018-03-26 NOTE — TELEPHONE ENCOUNTER
Refill encounter dated 2/5/18 indicated that no further losartan prescriptions without appointment, and patient's daughter was notified of that.

## 2018-04-10 DIAGNOSIS — Z79.60 LONG-TERM USE OF IMMUNOSUPPRESSANT MEDICATION: ICD-10-CM

## 2018-04-10 DIAGNOSIS — M32.19 SYSTEMIC LUPUS ERYTHEMATOSUS WITH OTHER ORGAN INVOLVEMENT, UNSPECIFIED SLE TYPE (H): ICD-10-CM

## 2018-04-10 DIAGNOSIS — I10 ESSENTIAL HYPERTENSION: ICD-10-CM

## 2018-04-11 RX ORDER — LOSARTAN POTASSIUM 25 MG/1
25 TABLET ORAL DAILY
Refills: 0 | OUTPATIENT
Start: 2018-04-11

## 2018-04-11 RX ORDER — PREDNISONE 2.5 MG/1
2.5 TABLET ORAL DAILY
Qty: 5 TABLET | Refills: 0 | Status: SHIPPED | OUTPATIENT
Start: 2018-04-11

## 2018-04-11 NOTE — TELEPHONE ENCOUNTER
predniSONE (DELTASONE) 2.5 MG tablet  Last Written Prescription Date:  3/12/18  Last Fill Quantity: 30,   # refills: 0  Last Office Visit : 10/6/16  Future Office visit:  4/13/18    Routing  Because: lv  10/16  Cancelled appt 3/23/18   Hx cancel, no show appts. F/u  4/13/18

## 2018-04-11 NOTE — TELEPHONE ENCOUNTER
losartan (COZAAR) 25 MG tablet  Refused per DR Duran note 2/5/18    Scheduling has been notified to contact the pt for appointment.

## 2018-04-12 DIAGNOSIS — Z79.60 LONG-TERM USE OF IMMUNOSUPPRESSANT MEDICATION: ICD-10-CM

## 2018-04-16 RX ORDER — AZATHIOPRINE 50 MG/1
50 TABLET ORAL DAILY
Qty: 30 TABLET | Refills: 0 | OUTPATIENT
Start: 2018-04-16

## 2018-04-16 NOTE — TELEPHONE ENCOUNTER
azaTHIOprine (IMURAN) 50 MG   Last Written Prescription Date:  2/12/18  Last Fill Quantity: 30,   # refills: 1  Last Office Visit: 10/6/16  Future Office visit:  4/13/18    CBC RESULTS:   Recent Labs   Lab Test  01/20/16   1225   WBC  3.5*   RBC  4.22   HGB  10.2*   HCT  32.2*   MCV  76*   MCH  24.2*   MCHC  31.7   RDW  16.3*   PLT  213       Creatinine   Date Value Ref Range Status   12/07/2017 1.03 0.52 - 1.04 mg/dL Final   ]    Liver Function Studies -   Recent Labs   Lab Test  01/20/16   1225  11/13/15   1131   PROTTOTAL   --   7.0   ALBUMIN   --   3.3*   BILITOTAL   --   0.5   ALKPHOS   --   51   AST  21  21   ALT  12  19       Routing refill request to provider for review/approval because:  OVER DUE LABS & RTC APPT.    HISTORY OF CANCELS & NOSHOWS   4/13/18  NO SHOW

## 2018-04-27 ENCOUNTER — TELEPHONE (OUTPATIENT)
Dept: INTERNAL MEDICINE | Facility: CLINIC | Age: 58
End: 2018-04-27

## 2018-04-27 NOTE — TELEPHONE ENCOUNTER
"[facility] Call Center    Phone Message    May a detailed message be left on voicemail: yes    Reason for Call: Medication Question or concern regarding medication   Prescription Clarification  Name of Medication: Per pt, \"all of them\"  Prescribing Provider: Dr. Duran   Pharmacy:  Pharmcy   What on the order needs clarification? Per pt, requesting to speak with Dr. Duran Nurse          Action Taken: Message routed to:  [facility]    "

## 2018-04-27 NOTE — TELEPHONE ENCOUNTER
Left voice message for patient's daughter, Marie, and also responded to [facility] message from 4/25/18.  Indicated that patient needs to be seen every 12 months for Dr. Duran to continue writing prescriptions. She has appointment scheduled for 5/11/18, and recommended that she keep that appointment. Alternatively, [facility] message indicates that patient has been seen in [place],and she could continue her care through that office if it is closer to home and more convenient.     If patient is planning to keep appointment on 5/11 and needs refills prior to that, would send prescription for 2-week supply to get to appointment. No other refills will be approved.

## 2018-04-30 NOTE — TELEPHONE ENCOUNTER
" [facility] Call Center    Phone Message    May a detailed message be left on voicemail: yes    Reason for Call: Medication Refill Request    Has the patient contacted the pharmacy for the refill? Yes   Name of medication being requested: Per pt's daughter, \"Tere is aware and knows, please include Prednisone as well.\"  Provider who prescribed the medication: Dr. Duran  Pharmacy:  Pharmacy  Date medication is needed: ASAP and 2 weeks of medication to last until appt 5/11/2018    Action Taken: Message routed to:  [facility]  "

## 2018-05-03 ENCOUNTER — OFFICE VISIT (OUTPATIENT)
Dept: FAMILY MEDICINE | Facility: CLINIC | Age: 58
End: 2018-05-03
Payer: COMMERCIAL

## 2018-05-03 VITALS
HEIGHT: 63 IN | OXYGEN SATURATION: 100 % | DIASTOLIC BLOOD PRESSURE: 56 MMHG | WEIGHT: 104 LBS | TEMPERATURE: 98.4 F | BODY MASS INDEX: 18.43 KG/M2 | HEART RATE: 113 BPM | SYSTOLIC BLOOD PRESSURE: 130 MMHG

## 2018-05-03 DIAGNOSIS — N18.30 CHRONIC KIDNEY DISEASE, STAGE III (MODERATE) (H): Primary | ICD-10-CM

## 2018-05-03 DIAGNOSIS — Z12.31 ENCOUNTER FOR SCREENING MAMMOGRAM FOR BREAST CANCER: ICD-10-CM

## 2018-05-03 DIAGNOSIS — Z23 NEED FOR TD VACCINE: ICD-10-CM

## 2018-05-03 DIAGNOSIS — I26.99 OTHER PULMONARY EMBOLISM WITHOUT ACUTE COR PULMONALE, UNSPECIFIED CHRONICITY (H): ICD-10-CM

## 2018-05-03 DIAGNOSIS — E78.5 HYPERLIPIDEMIA WITH TARGET LDL LESS THAN 100: ICD-10-CM

## 2018-05-03 DIAGNOSIS — D63.8 ANEMIA IN OTHER CHRONIC DISEASES CLASSIFIED ELSEWHERE: ICD-10-CM

## 2018-05-03 DIAGNOSIS — I10 HYPERTENSION GOAL BP (BLOOD PRESSURE) < 140/90: ICD-10-CM

## 2018-05-03 LAB
BASOPHILS # BLD AUTO: 0 10E9/L (ref 0–0.2)
BASOPHILS NFR BLD AUTO: 0 %
DIFFERENTIAL METHOD BLD: ABNORMAL
EOSINOPHIL # BLD AUTO: 0 10E9/L (ref 0–0.7)
EOSINOPHIL NFR BLD AUTO: 1.1 %
ERYTHROCYTE [DISTWIDTH] IN BLOOD BY AUTOMATED COUNT: 15.6 % (ref 10–15)
HCT VFR BLD AUTO: 31.1 % (ref 35–47)
HGB BLD-MCNC: 10.1 G/DL (ref 11.7–15.7)
LYMPHOCYTES # BLD AUTO: 1 10E9/L (ref 0.8–5.3)
LYMPHOCYTES NFR BLD AUTO: 33.7 %
MCH RBC QN AUTO: 25.6 PG (ref 26.5–33)
MCHC RBC AUTO-ENTMCNC: 32.5 G/DL (ref 31.5–36.5)
MCV RBC AUTO: 79 FL (ref 78–100)
MONOCYTES # BLD AUTO: 0.5 10E9/L (ref 0–1.3)
MONOCYTES NFR BLD AUTO: 18.6 %
NEUTROPHILS # BLD AUTO: 1.3 10E9/L (ref 1.6–8.3)
NEUTROPHILS NFR BLD AUTO: 46.6 %
PLATELET # BLD AUTO: 179 10E9/L (ref 150–450)
RBC # BLD AUTO: 3.95 10E12/L (ref 3.8–5.2)
WBC # BLD AUTO: 2.9 10E9/L (ref 4–11)

## 2018-05-03 PROCEDURE — 85025 COMPLETE CBC W/AUTO DIFF WBC: CPT | Performed by: FAMILY MEDICINE

## 2018-05-03 PROCEDURE — 80061 LIPID PANEL: CPT | Performed by: FAMILY MEDICINE

## 2018-05-03 PROCEDURE — 80053 COMPREHEN METABOLIC PANEL: CPT | Performed by: FAMILY MEDICINE

## 2018-05-03 PROCEDURE — 36415 COLL VENOUS BLD VENIPUNCTURE: CPT | Performed by: FAMILY MEDICINE

## 2018-05-03 PROCEDURE — 99214 OFFICE O/P EST MOD 30 MIN: CPT | Performed by: FAMILY MEDICINE

## 2018-05-03 RX ORDER — LOSARTAN POTASSIUM 25 MG/1
25 TABLET ORAL DAILY
Qty: 90 TABLET | Refills: 3 | Status: SHIPPED | OUTPATIENT
Start: 2018-05-03

## 2018-05-03 NOTE — NURSING NOTE
"Chief Complaint   Patient presents with     Establish Care     Recheck Medication       Initial /56  Pulse 113  Temp 98.4  F (36.9  C) (Oral)  Ht 5' 3\" (1.6 m)  Wt 104 lb (47.2 kg)  SpO2 100%  BMI 18.42 kg/m2 Estimated body mass index is 18.42 kg/(m^2) as calculated from the following:    Height as of this encounter: 5' 3\" (1.6 m).    Weight as of this encounter: 104 lb (47.2 kg).  Medication Reconciliation: complete   Floresita Bowers Certified Medical Assistant    "

## 2018-05-03 NOTE — PROGRESS NOTES
SUBJECTIVE:   Nikki Hobbs is a 58 year old female who presents to clinic today for the following health issues:    Pt is here to establish care:    She is requesting refills of her medications and would like a 1 year prescription for travel.    Pt is currently only taking losartan, prednisone, Xarelto, OTC Vitamin D3. Pt has not taken her atorvastatin since 2016. She does not have hx of vascular disease. Pt has hx of blood clot and this was why she was on Xarelto and was recommended statin at that time as well. Her last lipid panel was 3 years. Pt is not taking Gabapentin. For one year pt had issues with her legs which was thought to be secondary to her blood clot. However, it was     Pt declines all immunizations and preventative services today including tetanus and mammogram.      The 10-year ASCVD risk score (Reginalin BELTRAN Jr, et al., 2013) is: 5%    Values used to calculate the score:      Age: 58 years      Sex: Female      Is Non- : Yes      Diabetic: No      Tobacco smoker: No      Systolic Blood Pressure: 130 mmHg      Is BP treated: Yes      HDL Cholesterol: 52 mg/dL      Total Cholesterol: 140 mg/dL     Problem list and histories reviewed & adjusted, as indicated.  Additional history: as documented    Reviewed and updated as needed this visit by clinical staff  Tobacco  Allergies  Meds  Med Hx  Surg Hx  Fam Hx  Soc Hx      Reviewed and updated as needed this visit by Provider       ROS:  Constitutional, HEENT, cardiovascular, pulmonary, gi and gu systems are negative, except as otherwise noted.    This document serves as a record of the services and decisions personally performed and made by Clark Vigil MD. It was created on his behalf by Leonel Harper, a trained medical scribe. The creation of this document is based on the provider's statements to the medical scribe.  Leonel Harper 2:21 PM May 3, 2018    OBJECTIVE:     /56  Pulse 113  Temp 98.4  F (36.9  C) (Oral)  " Ht 1.6 m (5' 3\")  Wt 47.2 kg (104 lb)  SpO2 100%  BMI 18.42 kg/m2  Body mass index is 18.42 kg/(m^2).  GENERAL: healthy, alert and no distress  NECK: no adenopathy, no asymmetry, masses, or scars and thyroid normal to palpation  RESP: lungs clear to auscultation - no rales, rhonchi or wheezes  CV: regular rate and rhythm, normal S1 S2, no S3 or S4, no murmur, click or rub, no peripheral edema and peripheral pulses strong    Diagnostic Test Results:  No results found for this or any previous visit (from the past 24 hour(s)).    ASSESSMENT/PLAN:     (N18.3) Chronic kidney disease, stage III (moderate)  (primary encounter diagnosis)  Comment: Will recheck CMP for further evaluation. Refilled her losartan today; advised her to continue to take this to control her BP and reduce progression of her CDK.2  Plan: CBC with platelets differential, Comprehensive         metabolic panel (BMP + Alb, Alk Phos, ALT, AST,        Total. Bili, TP)        Follow up based on labs.    (E78.5) Hyperlipidemia with target LDL less than 100  Comment: Discussed with pt that we will recheck her lipid panel today and update her ASCVD score. Discussed with pt that if this score is above 10%, will recommend putting her back on her atorvastatin in order to reduce her risk of heart disease.  Plan: Lipid panel reflex to direct LDL Non-fasting        Will reach out to [facility] with the results of her lipid panel and her updated ASCVD score.    (I10) Hypertension goal BP (blood pressure) < 140/90  Comment: Her BP was good today (130/56); will refill her losartan and have her continue as prescribed.  Plan: Comprehensive metabolic panel (BMP + Alb, Alk         Phos, ALT, AST, Total. Bili, TP), losartan         (COZAAR) 25 MG tablet        Follow up if needed.    (Z23) Need for TD vaccine  Comment: Pt declined this today.  Plan: Advised her to follow up if she wants to update this.    (Z12.31) Encounter for screening mammogram for breast " cancer  Comment: Pt declined mammogram today.  Plan: Advised pt to follow up if she wants to have this done.    (I26.99) Other pulmonary embolism without acute cor pulmonale, unspecified chronicity (H)  Comment: Will refill her Xarelto today and have her continue to take as prescribed. Will also recheck CBC today for further evaluation.  Plan: rivaroxaban ANTICOAGULANT (XARELTO) 15 MG TABS         tablet        Follow up if needed.    Lupus- Pt was requesting refill of her prednisone. However, I discussed with pt that she needs to follow up with her rheumatologist in order to get her prednisone refilled for her Lupus. Therefore, will refer her back to Dr. Webber who was previously managing this for pt in order to discuss refilling her prednisone.    Anemia - Hgb remains low today, just above 10.  Likely related to anemia of chronic disease.  Will continue to follow with CBC annually.    The information in this document, created by the medical scribe for me, accurately reflects the services I personally performed and the decisions made by me. I have reviewed and approved this document for accuracy prior to leaving the patient care area.  May 3, 2018 2:21 PM    Clark Vigil Jr, MD  [facility]

## 2018-05-03 NOTE — PROGRESS NOTES
Ms. Hobbs,    -Hemoglobin is decreased indicating anemia.  Anemia can cause fatigue and, occasionally, light-headedness.  ADVISE: This is a problem that has been ongoing for years and is likely due to the multiple other chronic diseases you have.  We will simply continue to follow this with a blood test done 1-2 times per year to ensure your Hgb does not drop below 9.  -White blood cell and platelet counts are normal.    If you have further questions about the interpretation of your labs, [email or web address] is a good website to check out for further information.    Please contact the clinic if you have additional questions.  Thank you.    Sincerely,    Clark Vigil MD

## 2018-05-04 LAB
ALBUMIN SERPL-MCNC: 3.5 G/DL (ref 3.4–5)
ALP SERPL-CCNC: 62 U/L (ref 40–150)
ALT SERPL W P-5'-P-CCNC: 23 U/L (ref 0–50)
ANION GAP SERPL CALCULATED.3IONS-SCNC: 9 MMOL/L (ref 3–14)
AST SERPL W P-5'-P-CCNC: 21 U/L (ref 0–45)
BILIRUB SERPL-MCNC: 0.3 MG/DL (ref 0.2–1.3)
BUN SERPL-MCNC: 15 MG/DL (ref 7–30)
CALCIUM SERPL-MCNC: 9 MG/DL (ref 8.5–10.1)
CHLORIDE SERPL-SCNC: 110 MMOL/L (ref 94–109)
CHOLEST SERPL-MCNC: 163 MG/DL
CO2 SERPL-SCNC: 27 MMOL/L (ref 20–32)
CREAT SERPL-MCNC: 1 MG/DL (ref 0.52–1.04)
GFR SERPL CREATININE-BSD FRML MDRD: 57 ML/MIN/1.7M2
GLUCOSE SERPL-MCNC: 125 MG/DL (ref 70–99)
HDLC SERPL-MCNC: 46 MG/DL
LDLC SERPL CALC-MCNC: 102 MG/DL
NONHDLC SERPL-MCNC: 117 MG/DL
POTASSIUM SERPL-SCNC: 3.9 MMOL/L (ref 3.4–5.3)
PROT SERPL-MCNC: 7.2 G/DL (ref 6.8–8.8)
SODIUM SERPL-SCNC: 146 MMOL/L (ref 133–144)
TRIGL SERPL-MCNC: 76 MG/DL

## 2018-05-06 NOTE — PROGRESS NOTES
Ms. Hobbs,    -Liver and gallbladder tests (ALT,AST, Alk phos,bilirubin) are normal.  -Kidney function (GFR) is decreased.  ADVISE: recheck in 6 months (BMP, DX: 593.9 - unspecified disorder of kidney )  -Sodium is normal.  -Potassium is normal.  -Glucose (diabetic screening test) is normal.  -Cholesterol results show slightly low good cholesterol (HDL).  Exercise will help raise this.  The remainder of your cholesterol is normal.  -The 10-year ASCVD risk score (Whittemore DEVIN Jr, et al., 2013) is: 6.3%.  Normal is less than 10% so this is fine.    Values used to calculate the score:      Age: 58 years      Sex: Female      Is Non- : Yes      Diabetic: No      Tobacco smoker: No      Systolic Blood Pressure: 130 mmHg      Is BP treated: Yes      HDL Cholesterol: 46 mg/dL      Total Cholesterol: 163 mg/dL     If you have further questions about the interpretation of your labs, [email or web address] is a good website to check out for further information.    Please contact the clinic if you have additional questions.  Thank you.    Sincerely,    Clark Vigil MD

## 2018-05-07 ENCOUNTER — MYC MEDICAL ADVICE (OUTPATIENT)
Dept: INTERNAL MEDICINE | Facility: CLINIC | Age: 58
End: 2018-05-07

## 2018-05-07 ENCOUNTER — TELEPHONE (OUTPATIENT)
Dept: INTERNAL MEDICINE | Facility: CLINIC | Age: 58
End: 2018-05-07

## 2018-05-07 NOTE — TELEPHONE ENCOUNTER
[facility] Call Center    Phone Message    May a detailed message be left on voicemail: no    Reason for Call: Other: Patient's daughter, Marie, is requesting Dr. Duran take over prescribing the Prednisone. Patient had lab work done at [place] on 5/3/18. Please contact Marie back at [phone]. She says she is returning Tere's call from last week & has tried a couple times since then to get in touch with Tere.      Action Taken: Message routed to:  [facility] Primary Care

## 2018-05-08 ENCOUNTER — TELEPHONE (OUTPATIENT)
Dept: RHEUMATOLOGY | Facility: CLINIC | Age: 58
End: 2018-05-08

## 2018-05-08 NOTE — TELEPHONE ENCOUNTER
Called and spoke with pt's daughter Marie.  She would like Dr. Webber to prescribe prednisone for her mom.  Discussed that she has not been seen in a year, and would need to be seen by Dr. Webber in order to continue to receive prescriptions.  Daughter states that they just saw another doctor on May 3rd and had labs done, and that should be good enough.  She states it is too far for pt to come to be seen.  She believes that Dr. Webber should just order medication for her mom.  I explained that labs do not tell the whole story and that her mom needs to be seen to get refills of her medication.  I did offer an appt to see Dr. Webber on June 15th and daughter indicated that her mom would not be in town. She then declined to find another appt and discontinued conversation.    In review of chart, it does appear that pt has also tried to get PCP to fill medication and was told that she would also need to see PCP before it could be prescribed. It also appears that pt may be trying to switch to a more natural approach for her illness's.    Margarita Leary RN  Rheumatology Clinic

## 2018-05-08 NOTE — TELEPHONE ENCOUNTER
[facility] Call Center    Phone Message    May a detailed message be left on voicemail: yes    Reason for Call: Other: Pt's daughter is calling to f/u on their request for Prednisone to be [place] [facility] [place]. Daughter stated that they've tried several times to get in touch with Tere. Please call back.      Action Taken: Message routed to:  [facility] PRIMARY CARE

## 2018-05-08 NOTE — TELEPHONE ENCOUNTER
[facility] Call Center    Phone Message    May a detailed message be left on voicemail: yes    Reason for Call: Other: Patient has medication questions - did not elaborate. Please call to discuss.      Action Taken: Message routed to:  [facility] shaye

## 2018-07-15 ENCOUNTER — TELEPHONE (OUTPATIENT)
Dept: FAMILY MEDICINE | Facility: CLINIC | Age: 58
End: 2018-07-15

## 2018-07-15 DIAGNOSIS — I26.99 OTHER PULMONARY EMBOLISM WITHOUT ACUTE COR PULMONALE, UNSPECIFIED CHRONICITY (H): ICD-10-CM

## 2018-07-16 NOTE — TELEPHONE ENCOUNTER
Patient's daughter Marie bosch (consent to communicate obtained from patient). Patient out of her xarelto and pharmacy is closed. Sent x 1 month supply to pharmacy.

## 2018-09-24 DIAGNOSIS — I26.99 OTHER PULMONARY EMBOLISM WITHOUT ACUTE COR PULMONALE, UNSPECIFIED CHRONICITY (H): ICD-10-CM

## 2018-09-24 NOTE — TELEPHONE ENCOUNTER
"Requested Prescriptions   Pending Prescriptions Disp Refills     rivaroxaban ANTICOAGULANT (XARELTO) 15 MG TABS tablet  Last Written Prescription Date:  7/15/2018  Last Fill Quantity: 30 tablet,  # refills: 0   Last office visit: 5/3/2018 with prescribing provider:  Musa   Future Office Visit:       30 tablet 0     Sig: Take 1 tablet (15 mg) by mouth daily (with dinner)    Platelet Inhibitors Failed    9/24/2018 10:24 AM       Failed - Normal HGB on file in past 12 months    Recent Labs   Lab Test  05/03/18   1443   HGB  10.1*              Passed - Normal ALT on file in past 12 months    Recent Labs   Lab Test  05/03/18   1443   ALT  23            Passed - Normal AST on file in past 12 months    Recent Labs   Lab Test  05/03/18   1443   AST  21            Passed - Normal Platelets on file in past 12 months    Recent Labs   Lab Test  05/03/18   1443   PLT  179              Passed - Recent (12 mo) or future (30 days) visit within the authorizing provider's specialty    Patient had office visit in the last 12 months or has a visit in the next 30 days with authorizing provider or within the authorizing provider's specialty.  See \"Patient Info\" tab in inbasket, or \"Choose Columns\" in Meds & Orders section of the refill encounter.           Passed - Patient is age 18 or older       Passed - No active pregnancy on record       Passed - Normal serum creatinine on file in past 12 months    Recent Labs   Lab Test  05/03/18   1443   12/19/14   0235   CR  1.00   < >   --    CREAT   --    --   1.8*    < > = values in this interval not displayed.            Passed - No positive pregnancy test in past 12 months          "

## 2018-09-24 NOTE — TELEPHONE ENCOUNTER
Nikki is now calling again asking for refill, notes reviewed, patient says she is to be on this lifetime. Refilled last month when she was in [place] also, I asked patient if she has moved there and she says she has not. I asked her if her PCP was Dr. Duran as that is who we have listed and she told me she saw Dr. Vigil in May. Dr. Vigil please advise for further refills of Xarelto plan.  Thank you      Prescription approved per [facility] RN Refill Protocol and sent at this time as patient is out. Addis Pemberton R.N.

## 2018-09-24 NOTE — TELEPHONE ENCOUNTER
Reason for Call:  Medication or medication refill:    Do you use a [facility]?  Name of the pharmacy and phone number for the current request:  [facility] in [place]--see pended pharmacy    Name of the medication requested: Xarelto    Other request:  Pt in [place] temporarily and needs refill of Xarelto.  States her pharm has sent request 2 times but we do not have req on file.    Can we leave a detailed message on this number? YES    Phone number patient can be reached at: Cell number on file:    Telephone Information:   Mobile [phone]       Best Time:     Call taken on 9/24/2018 at 10:21 AM by Melita Crabtree

## 2019-08-28 ENCOUNTER — MEDICAL CORRESPONDENCE (OUTPATIENT)
Dept: HEALTH INFORMATION MANAGEMENT | Facility: CLINIC | Age: 59
End: 2019-08-28

## 2019-11-07 ENCOUNTER — HEALTH MAINTENANCE LETTER (OUTPATIENT)
Age: 59
End: 2019-11-07

## 2020-11-29 ENCOUNTER — HEALTH MAINTENANCE LETTER (OUTPATIENT)
Age: 60
End: 2020-11-29

## 2021-02-14 ENCOUNTER — HEALTH MAINTENANCE LETTER (OUTPATIENT)
Age: 61
End: 2021-02-14

## 2021-09-25 ENCOUNTER — HEALTH MAINTENANCE LETTER (OUTPATIENT)
Age: 61
End: 2021-09-25

## 2022-01-15 ENCOUNTER — HEALTH MAINTENANCE LETTER (OUTPATIENT)
Age: 62
End: 2022-01-15

## 2022-03-12 ENCOUNTER — HEALTH MAINTENANCE LETTER (OUTPATIENT)
Age: 62
End: 2022-03-12

## 2022-12-26 ENCOUNTER — HEALTH MAINTENANCE LETTER (OUTPATIENT)
Age: 62
End: 2022-12-26

## 2023-04-22 ENCOUNTER — HEALTH MAINTENANCE LETTER (OUTPATIENT)
Age: 63
End: 2023-04-22

## 2024-02-04 ENCOUNTER — HEALTH MAINTENANCE LETTER (OUTPATIENT)
Age: 64
End: 2024-02-04